# Patient Record
Sex: MALE | Race: WHITE | URBAN - NONMETROPOLITAN AREA
[De-identification: names, ages, dates, MRNs, and addresses within clinical notes are randomized per-mention and may not be internally consistent; named-entity substitution may affect disease eponyms.]

---

## 2020-02-28 ENCOUNTER — OFFICE VISIT (OUTPATIENT)
Dept: FAMILY MEDICINE CLINIC | Age: 30
End: 2020-02-28
Payer: COMMERCIAL

## 2020-02-28 VITALS
WEIGHT: 200 LBS | HEART RATE: 75 BPM | HEIGHT: 65 IN | SYSTOLIC BLOOD PRESSURE: 132 MMHG | TEMPERATURE: 98.7 F | BODY MASS INDEX: 33.32 KG/M2 | OXYGEN SATURATION: 97 % | DIASTOLIC BLOOD PRESSURE: 68 MMHG

## 2020-02-28 PROCEDURE — 99213 OFFICE O/P EST LOW 20 MIN: CPT | Performed by: PHYSICIAN ASSISTANT

## 2020-02-28 RX ORDER — NAPROXEN 500 MG/1
500 TABLET ORAL 2 TIMES DAILY WITH MEALS
Qty: 20 TABLET | Refills: 0 | Status: SHIPPED | OUTPATIENT
Start: 2020-02-28

## 2020-02-28 NOTE — PROGRESS NOTES
2020   [facility] [address]  [address] [place] [address]  [phone]    Alfie Koch  : 1990  Age: 30 y.o. Sex: male      Subjective:  Chief Complaint   Patient presents with    Shoulder Pain     left  following injury Saturday       HPI: Pt reports pain to the left shoulder. The patient the pain has been present for the last 7 days. 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 Patient states he heard a pop and since then has had pain whenever he has done certain activities. Patient states at rest he has no pain but with overhead activities pain goes to a 9. Patient denies numbness, tingling and weakness or paralysis to the distal extremity. No neck pain. The patient is having difficulty using the affected arm. Patient is left hand dominant. Patient has been taking tumeric at home with minimal relief. Patient comes to the urgent care for further evaluation. Current Outpatient Medications:     naproxen (NAPROSYN) 500 MG tablet, Take 1 tablet by mouth 2 times daily (with meals), Disp: 20 tablet, Rfl: 0   No Known Allergies     Objective:  Vitals:    20 0956   BP: 132/68   Pulse: 75   Temp: 98.7 °F (37.1 °C)   SpO2: 97%   Weight: 200 lb (90.7 kg)   Height: 5' 5\" (1.651 m)        Exam:  Const: Appears healthy and well developed. No signs of acute distress present. Head/Face: Head is normocephalic, atraumatic. Facies is symmetric. ENMT: Buccal mucosa is moist.  Neck: Trachea midline. Resp: No respiratory distress. Musculo: Pulses are equal bilaterally. Good capillary refill. The patient has tenderness over the left shoulder. patient has full range of motion although with flexion and abduction at approximately 90 degrees there is an audible popping sensation and pt experiences pain. No AC joint or clavicle tenderness noted. No joint effussion or septic joint. No bicipital groove tenderness.  Patient is able to perform scratch